# Patient Record
Sex: MALE | Race: WHITE | NOT HISPANIC OR LATINO | Employment: FULL TIME | URBAN - METROPOLITAN AREA
[De-identification: names, ages, dates, MRNs, and addresses within clinical notes are randomized per-mention and may not be internally consistent; named-entity substitution may affect disease eponyms.]

---

## 2017-07-10 ENCOUNTER — HOSPITAL ENCOUNTER (EMERGENCY)
Facility: MEDICAL CENTER | Age: 42
End: 2017-07-10
Attending: EMERGENCY MEDICINE
Payer: COMMERCIAL

## 2017-07-10 VITALS
HEIGHT: 67 IN | SYSTOLIC BLOOD PRESSURE: 131 MMHG | WEIGHT: 124.34 LBS | HEART RATE: 79 BPM | DIASTOLIC BLOOD PRESSURE: 95 MMHG | OXYGEN SATURATION: 100 % | BODY MASS INDEX: 19.52 KG/M2 | TEMPERATURE: 97.6 F | RESPIRATION RATE: 16 BRPM

## 2017-07-10 DIAGNOSIS — T78.40XA ALLERGIC REACTION, INITIAL ENCOUNTER: ICD-10-CM

## 2017-07-10 PROCEDURE — 700111 HCHG RX REV CODE 636 W/ 250 OVERRIDE (IP): Performed by: EMERGENCY MEDICINE

## 2017-07-10 PROCEDURE — 99284 EMERGENCY DEPT VISIT MOD MDM: CPT

## 2017-07-10 PROCEDURE — A9270 NON-COVERED ITEM OR SERVICE: HCPCS | Performed by: EMERGENCY MEDICINE

## 2017-07-10 PROCEDURE — 700102 HCHG RX REV CODE 250 W/ 637 OVERRIDE(OP): Performed by: EMERGENCY MEDICINE

## 2017-07-10 RX ORDER — PREDNISONE 20 MG/1
60 TABLET ORAL DAILY
Qty: 12 TAB | Refills: 0 | Status: SHIPPED | OUTPATIENT
Start: 2017-07-10 | End: 2017-07-14

## 2017-07-10 RX ORDER — PREDNISONE 20 MG/1
60 TABLET ORAL ONCE
Status: COMPLETED | OUTPATIENT
Start: 2017-07-10 | End: 2017-07-10

## 2017-07-10 RX ORDER — PREDNISONE 20 MG/1
60 TABLET ORAL DAILY
Qty: 30 TAB | Refills: 0 | Status: SHIPPED | OUTPATIENT
Start: 2017-07-10

## 2017-07-10 RX ORDER — DIPHENHYDRAMINE HCL 25 MG
50 TABLET ORAL ONCE
Status: COMPLETED | OUTPATIENT
Start: 2017-07-10 | End: 2017-07-10

## 2017-07-10 RX ADMIN — PREDNISONE 60 MG: 20 TABLET ORAL at 13:16

## 2017-07-10 RX ADMIN — DIPHENHYDRAMINE HCL 50 MG: 25 TABLET ORAL at 13:17

## 2017-07-10 ASSESSMENT — PAIN SCALES - WONG BAKER: WONGBAKER_NUMERICALRESPONSE: DOESN'T HURT AT ALL

## 2017-07-10 ASSESSMENT — PAIN SCALES - GENERAL: PAINLEVEL_OUTOF10: 0

## 2017-07-10 NOTE — ED NOTES
"Pt states feels better, \"even though I don't look better.\"  Pt denies difficulty swallowing or breathing, given discharge instructions and prescription, verbalized understanding to information provided including return precautions and medications, denied questions/concerns.  Pt ambulated from ER w/ mom.    "

## 2017-07-10 NOTE — ED AVS SNAPSHOT
7/10/2017    Francis [address]  [address] [place] [address]    Dear Francis:    [facility] wants to ensure your discharge home is safe and you or your loved ones have had all of your questions answered regarding your care after you leave the hospital.    Below is a list of resources and contact information should you have any questions regarding your hospital stay, follow-up instructions, or active medical symptoms.    Questions or Concerns Regarding… Contact   Medical Questions Related to Your Discharge  (7 days a week, 8am-5pm) Contact a Nurse Care Coordinator   [phone]   Medical Questions Not Related to Your Discharge  (24 hours a day / 7 days a week)  Contact the Nurse Health Line   [phone]    Medications or Discharge Instructions Refer to your discharge packet   or contact your [facility] Primary Care Provider   [phone]   Follow-up Appointment(s) Schedule your appointment via Maps InDeed   or contact Scheduling [phone]   Billing Review your statement via Maps InDeed  or contact Billing [phone]   Medical Records Review your records via Maps InDeed   or contact Medical Records [phone]     You may receive a telephone call within two days of discharge. This call is to make certain you understand your discharge instructions and have the opportunity to have any questions answered. You can also easily access your medical information, test results and upcoming appointments via the Maps InDeed free online health management tool. You can learn more and sign up at Searchspace/Maps InDeed. For assistance setting up your Maps InDeed account, please call [phone].    Once again, we want to ensure your discharge home is safe and that you have a clear understanding of any next steps in your care. If you have any questions or concerns, please do not hesitate to contact us, we are here for you. Thank you for choosing [facility] for your healthcare needs.    Sincerely,    Your [facility] Healthcare Team

## 2017-07-10 NOTE — ED PROVIDER NOTES
"ED Provider Note    CHIEF COMPLAINT  Chief Complaint   Patient presents with   • Allergic Reaction       HPI  Francis Street is a 42 y.o. male who presents to the emergency department with facial swelling. The patient states that he is having some Gatorade as well as some knots which he said the past when he started developing facial swelling and swelling of his lip. He does not have any difficulty breathing or swallowing. He has not any vomiting. The patient is unaware of any rashes. While in the waiting room the patient felt like he was going to have a seizure. At this time the patient does feel better after lying down in a quiet room. He states he's been compliant with his medication.    REVIEW OF SYSTEMS  See HPI for further details. All other systems are negative.     PAST MEDICAL HISTORY  Past Medical History   Diagnosis Date   • Hypertension    • Seizure        SOCIAL HISTORY  Social History     Social History   • Marital Status: Single     Spouse Name: N/A   • Number of Children: N/A   • Years of Education: N/A     Social History Main Topics   • Smoking status: Former Smoker   • Smokeless tobacco: Current User     Types: Chew   • Alcohol Use: Yes      Comment: states 1-2 drinks daily   • Drug Use: No   • Sexual Activity: Not on file     Other Topics Concern   • Not on file     Social History Narrative   • No narrative on file           PHYSICAL EXAM  VITAL SIGNS: /71 mmHg  Pulse 99  Temp(Src) 36.4 °C (97.6 °F)  Resp 16  Ht 1.702 m (5' 7\")  Wt 56.4 kg (124 lb 5.4 oz)  BMI 19.47 kg/m2  Constitutional: Well developed, Well nourished, No acute distress, Non-toxic appearance.   HENT: Right sided facial swelling including the lip, tympanic membranes are intact and nonerythematous bilaterally, Oropharynx moist without exudates or erythema, Nose normal.   Eyes: PERRLA, EOMI, Conjunctiva normal.  Neck: Supple without meningismus  Lymphatic: No lymphadenopathy noted.   Cardiovascular: Normal heart rate, " Normal rhythm, No murmurs, No rubs, No gallops.   Thorax & Lungs: Normal breath sounds, No respiratory distress, No wheezing, No chest tenderness.   Abdomen: Bowel sounds normal, Soft, No tenderness, no rebound, no guarding, no distention, No masses, No pulsatile masses.   Skin: Warm, Dry, No erythema, No rash.   Back: No tenderness, No CVA tenderness.   Extremities: Atraumatic with symmetric distal pulses, No edema, No tenderness, No cyanosis, No clubbing.   Neurologic: Alert & oriented x 3, cranial nerves II through XII are intact, Normal motor function, Normal sensory function, No focal deficits noted.   Psychiatric: Affect normal, Judgment normal, Mood normal.     COURSE & MEDICAL DECISION MAKING  Pertinent Labs & Imaging studies reviewed. (See chart for details)  This a 42-year-old male who presents to the emergency department with allergic reaction. The patient received prednisone as well as Benadryl. He's been observed for approximately half an hour and he started to have resolution of his swelling. On repeat examination at the time of discharge she continues to show no evidence of uvular edema nor difficulty breathing. Therefore he'll be discharged home on 4 more days of steroids with instructions to take Benadryl as needed.    FINAL IMPRESSION  1. Allergic reaction     Disposition  The patient will be discharged stable condition    Electronically signed by: Panda Bernabe, 7/10/2017 1:06 PM

## 2017-07-10 NOTE — ED AVS SNAPSHOT
[email or web address] Access Code: [email or web address]  Expires: 8/9/2017  2:10 PM    Your email address is not on file at [facility].  Email Addresses are required for you to sign up for Reach.ly, please contact [phone] to verify your personal information and to provide your email address prior to attempting to register for Reach.ly.    Francis Street  [address] [place] [address]    Reach.ly  A secure, online tool to manage your health information     [facility]’s Reach.ly® is a secure, online tool that connects you to your personalized health information from the privacy of your home -- day or night - making it very easy for you to manage your healthcare. Once the activation process is completed, you can even access your medical information using the Reach.ly sheyla, which is available for free in the Apple Sheyla store or Google Play store.     To learn more about Reach.ly, visit [email or web address]    There are two levels of access available (as shown below):   My Chart Features  [facility] Primary Care Doctor [facility]  Specialists [facility]  Urgent  Care Non-[facility] Primary Care Doctor   Email your healthcare team securely and privately 24/7 X X X    Manage appointments: schedule your next appointment; view details of past/upcoming appointments X      Request prescription refills. X      View recent personal medical records, including lab and immunizations X X X X   View health record, including health history, allergies, medications X X X X   Read reports about your outpatient visits, procedures, consult and ER notes X X X X   See your discharge summary, which is a recap of your hospital and/or ER visit that includes your diagnosis, lab results, and care plan X X  X     How to register for [employer]:  Once your e-mail address has been verified, follow the following steps to sign up for Reach.ly.     1. Go to  [email or web address]  2. Click on the Sign Up Now box, which takes you to the New Member Sign Up page. You  will need to provide the following information:  a. Enter your Marketsync Access Code exactly as it appears at the top of this page. (You will not need to use this code after you’ve completed the sign-up process. If you do not sign up before the expiration date, you must request a new code.)   b. Enter your date of birth.   c. Enter your home email address.   d. Click Submit, and follow the next screen’s instructions.  3. Create a CelluCompt ID. This will be your Marketsync login ID and cannot be changed, so think of one that is secure and easy to remember.  4. Create a Marketsync password. You can change your password at any time.  5. Enter your Password Reset Question and Answer. This can be used at a later time if you forget your password.   6. Enter your e-mail address. This allows you to receive e-mail notifications when new information is available in Marketsync.  7. Click Sign Up. You can now view your health information.    For assistance activating your Marketsync account, call [phone]

## 2017-07-10 NOTE — ED NOTES
Ate some nuts 2 hours ago, experiencing facial swelling. No drooling, SOB at this time. Hx of seizure disorder and allergy to sour kraut.

## 2017-07-10 NOTE — ED NOTES
Pt roomed because he is hungry wanted to leave the facility, hx of seizures and feels like he could have one. Eating crackers, with mom in the room.

## 2017-07-10 NOTE — ED NOTES
Sat back in the lobby, advised to notify triage if there are any changes or worsening in symptoms. Charge notified.

## 2017-07-10 NOTE — ED AVS SNAPSHOT
Home Care Instructions                                                                                                                Francis Street   MRN: [id number]    Department:  [facility], Emergency Dept   Date of Visit:  7/10/2017            [facility], Emergency Dept    [address]    [place] [address]    Phone:  [phone]      You were seen by     Panda Bernabe M.D.      Your Diagnosis Was     Allergic reaction, initial encounter     [id number]       These are the medications you received during your hospitalization from 07/10/2017 1210 to 07/10/2017 1410     Date/Time Order Dose Route Action    07/10/2017 1317 diphenhydrAMINE (BENADRYL) tablet/capsule 50 mg 50 mg Oral Given    07/10/2017 1316 predniSONE (DELTASONE) tablet 60 mg 60 mg Oral Given      Follow-up Information     1. Follow up with [facility], Emergency Dept.    Specialty:  Emergency Medicine    Why:  If symptoms worsen    Contact information    [address] [address] [phone]      Medication Information     Review all of your home medications and newly ordered medications with your primary doctor and/or pharmacist as soon as possible. Follow medication instructions as directed by your doctor and/or pharmacist.     Please keep your complete medication list with you and share with your physician. Update the information when medications are discontinued, doses are changed, or new medications (including over-the-counter products) are added; and carry medication information at all times in the event of emergency situations.               Medication List      CHANGE how you take these medications        Instructions    Morning Afternoon Evening Bedtime    * predniSONE 20 MG Tabs   What changed:  You were already taking a medication with the same name, and this prescription was added. Make sure you understand how and when to take each.      Last time this was given:  60 mg on 7/10/2017  1:16 PM   Commonly known as:  DELTASONE        Take 3 Tabs by mouth every day.   Dose:  60 mg                        * predniSONE 20 MG Tabs   What changed:  how much to take   Last time this was given:  60 mg on 7/10/2017  1:16 PM   Commonly known as:  DELTASONE        Take 3 Tabs by mouth every day for 4 days. Take with food   Dose:  60 mg                        * Notice:  This list has 2 medication(s) that are the same as other medications prescribed for you. Read the directions carefully, and ask your doctor or other care provider to review them with you.      ASK your doctor about these medications        Instructions    Morning Afternoon Evening Bedtime    atenolol 25 MG Tabs   Commonly known as:  TENORMIN        Take 25 mg by mouth every day.   Dose:  25 mg                        lisinopril 20 MG Tabs   Commonly known as:  PRINIVIL        Take 20 mg by mouth 2 times a day.   Dose:  20 mg                        multivitamin Tabs        Take 1 Tab by mouth every day.   Dose:  1 Tab                        omeprazole 20 MG delayed-release capsule   Commonly known as:  PRILOSEC        Take 20 mg by mouth every day.   Dose:  20 mg                        phenytoin 50 MG Chew   Commonly known as:  DILANTIN        Take 100 mg by mouth 3 times a day.   Dose:  100 mg                             Where to Get Your Medications      You can get these medications from any pharmacy     Bring a paper prescription for each of these medications    - predniSONE 20 MG Tabs  - predniSONE 20 MG Tabs              Discharge Instructions       Take Benadryl 1-2 tablets as needed  Return for difficulty with breathing or swallowing          Patient Information     Patient Information    Following emergency treatment: all patient requiring follow-up care must return either to a private physician or a clinic if your condition worsens before you are able to obtain further medical attention,  please return to the emergency room.     Billing Information    At [facility], we work to make the billing process streamlined for our patients.  Our Representatives are here to answer any questions you may have regarding your hospital bill.  If you have insurance coverage and have supplied your insurance information to us, we will submit a claim to your insurer on your behalf.  Should you have any questions regarding your bill, we can be reached online or by phone as follows:  Online: You are able pay your bills online or live chat with our representatives about any billing questions you may have. We are here to help Monday - Friday from 8:00am to 7:30pm and 9:00am - 12:00pm on Saturdays.  Please visit [email or web address]  for more information.   Phone:  [phone] or [phone]    Please note that your emergency physician, surgeon, pathologist, radiologist, anesthesiologist, and other specialists are not employed by [facility] and will therefore bill separately for their services.  Please contact them directly for any questions concerning their bills at the numbers below:     Emergency Physician Services:  [phone]  [facility]:  [phone]  [facility]:  [phone]  [facility]:  [phone]    1. Your final bill may vary from the amount quoted upon discharge if all procedures are not complete at that time, or if your doctor has additional procedures of which we are not aware. You will receive an additional bill if you return to the Emergency Department at [facility] for suture removal regardless of the facility of which the sutures were placed.     2. Please arrange for settlement of this account at the emergency registration.    3. All self-pay accounts are due in full at the time of treatment.  If you are unable to meet this obligation then payment is expected within 4-5 days.     4. If you have had radiology  studies (CT, X-ray, Ultrasound, MRI), you have received a preliminary result during your emergency department visit. Please contact the radiology department [phone] to receive a copy of your final result. Please discuss the Final result with your primary physician or with the follow up physician provided.     Crisis Hotline:  [place] Crisis Hotline:  4-342-DBAXIVM or 1-794.340.4366  [place] Crisis Hotline:    1-898.629.4395 or 992-647-8982         ED Discharge Follow Up Questions    1. In order to provide you with very good care, we would like to follow up with a phone call in the next few days.  May we have your permission to contact you?     YES /  NO    2. What is the best phone number to call you? (       )_____-__________    3. What is the best time to call you?      Morning  /  Afternoon  /  Evening                   Patient Signature:  ____________________________________________________________    Date:  ____________________________________________________________

## 2019-05-06 ENCOUNTER — HOSPITAL ENCOUNTER (EMERGENCY)
Facility: MEDICAL CENTER | Age: 44
End: 2019-05-06
Attending: EMERGENCY MEDICINE
Payer: COMMERCIAL

## 2019-05-06 VITALS
SYSTOLIC BLOOD PRESSURE: 100 MMHG | OXYGEN SATURATION: 96 % | RESPIRATION RATE: 16 BRPM | HEART RATE: 96 BPM | DIASTOLIC BLOOD PRESSURE: 77 MMHG | TEMPERATURE: 98.5 F

## 2019-05-06 DIAGNOSIS — S01.81XA FACIAL LACERATION, INITIAL ENCOUNTER: ICD-10-CM

## 2019-05-06 DIAGNOSIS — F10.920 ALCOHOLIC INTOXICATION WITHOUT COMPLICATION (HCC): ICD-10-CM

## 2019-05-06 PROCEDURE — 99284 EMERGENCY DEPT VISIT MOD MDM: CPT

## 2019-05-06 NOTE — ED TRIAGE NOTES
Chief Complaint   Patient presents with   • Alcohol Intoxication   • Facial Laceration     R eyebrow     /85   Pulse (!) 120   Temp 36.3 °C (97.3 °F) (Temporal)   Resp 16   SpO2 93%     BIB EMS from a bar - pt intoxicated, fell off a barstool, laceration to R eyebrow. No LOC. Pt kicked AEMT multiple times en route, RPD at bedside    Chart up for eval.

## 2019-05-06 NOTE — ED PROVIDER NOTES
ED Provider Note    Scribed for Lisset Wood M.D. by Maritza Travis. 5/6/2019, 3:51 PM.    Primary care provider: Ted Ramirez M.D.  Means of arrival: ambulance  History obtained from: patient  History limited by: Patient's altered mental status consistent with alcohol intoxication    CHIEF COMPLAINT  Chief Complaint   Patient presents with   • Alcohol Intoxication   • Facial Laceration     R eyebrow       HPI  Francis Street is a 44 y.o. male who presents to the Emergency Department after falling off of a barstool just prior to arrival in the ED believed to be secondary to his alcohol intoxication. Patient struck the right side of his face during this incident, sustaining a laceration to the right eye brow. He reports no headache, neck pain, or malaise. He states that he has had a tetanus shot within the last 2 years.    Patient's history limited secondary to his altered mental status consistent with alcohol intoxication.    REVIEW OF SYSTEMS  Pertinent positive include fall, laceration. Pertinent negatives include no headache, neck pain, malaise.   See HPI for further details.     Review of systems limited secondary to the patient's altered mental status consistent with alcohol intoxication.     PAST MEDICAL HISTORY  Past Medical History:   Diagnosis Date   • Hypertension    • Seizure        SOCIAL HISTORY  Social History   Substance Use Topics   • Smoking status: Former Smoker   • Smokeless tobacco: Current User     Types: Chew   • Alcohol use Yes      Comment: states 1-2 drinks daily      History   Drug Use No     CURRENT MEDICATIONS  No current facility-administered medications for this encounter.     Current Outpatient Prescriptions:   •  predniSONE (DELTASONE) 20 MG Tab, Take 3 Tabs by mouth every day., Disp: 30 Tab, Rfl: 0  •  lisinopril (PRINIVIL) 20 MG TABS, Take 20 mg by mouth 2 times a day., Disp: , Rfl:   •  phenytoin (DILANTIN) 50 MG CHEW, Take 100 mg by mouth 3 times a day., Disp: , Rfl:    •  omeprazole (PRILOSEC) 20 MG CPDR, Take 20 mg by mouth every day., Disp: , Rfl:   •  multivitamin (THERAGRAN) TABS, Take 1 Tab by mouth every day., Disp: , Rfl:   •  atenolol (TENORMIN) 25 MG TABS, Take 25 mg by mouth every day., Disp: , Rfl:     ALLERGIES  Allergies   Allergen Reactions   • Food      Sour kraut, possible nuts       PHYSICAL EXAM  VITAL SIGNS: /85   Pulse (!) 120   Temp 36.3 °C (97.3 °F) (Temporal)   Resp 16   SpO2 93%   Vitals reviewed.  Consitutional: Well-developed, well-nourished, disheveled. Negative for: distress.  HENT: Mucous membrane are dry with chapped lips, 1 cm full thickness laceration below right eyebrow, Hematoma to lateral right eyelid, Normocephalic, right external ear normal, left external ear normal, oropharynx clear.  Eyes: 1 cm full thickness laceration to right eyebrow, Hematoma to lateral right eyelid, PERRLA at 3mm, EOMI including upward gaze, Conjunctivae normal. Negative for: discharge in right and left eye, icterus.  Neck: No tenderness to palpation in neck, Range of motion normal, supple. Negative for cervical adenopathy.  Musculoskeletal: Normal range of motion. Negative for edema.  Neurological: Somnolent and intoxicated but oriented x2.  Unable to cooperate with a full exam  Skin: Warm, dry. Negative for rash.  Psych: Intoxicated, occasionally agitated and combative    COURSE & MEDICAL DECISION MAKING  Nursing notes, VS, PMSFHx reviewed in chart.    3:51 PM Patient seen and examined at bedside. The patient presents with facial injury without signs of intracranial injury. I informed we would repair the patient's laceration at bedside. He will remain in the ED until clinically sober.   Steri strips used to reapproximate skin wound edges after wound cleaned with sterile saline. Good cosmesis and hemostasis achieved.  PT tolerated procedure well.    6:28 PM Nursing staff will road test and PO challenge the patient to see if he is able to ambulate  appropriately on his own and can then be discharged.    7:22 PM patient is ambulatory without assistance and tolerating p.o. solids.  His mother has agreed to come get him.     The patient will return for new or worsening symptoms and is stable at the time of discharge.    The patient is referred to a primary physician for blood pressure management, diabetic screening, and for all other preventative health concerns.    DISPOSITION:  Patient will be discharged home in stable condition.    FOLLOW UP:  Ted Ramirez M.D.  [address] [place] [id number]  [phone]      As needed      OUTPATIENT MEDICATIONS:  Discharge Medication List as of 5/6/2019  7:15 PM            FINAL IMPRESSION  1. Facial laceration, initial encounter    2. Alcoholic intoxication without complication (HCC)          Maritza CAO (Scribe), am scribing for, and in the presence of, Lisset Wood M.D..    Electronically signed by: Maritza Travis (Scribe), 5/6/2019    Lisset CAO M.D. personally performed the services described in this documentation, as scribed by Maritza Travis in my presence, and it is both accurate and complete.    E    The note accurately reflects work and decisions made by me.  Lisset Wood  5/6/2019  7:22 PM

## 2019-05-07 NOTE — ED NOTES
Pt discharged. Pt received copy of discharge instructions and verbalized understanding. Pt ambulatory out of ED. Pt left with all personal belongings.